# Patient Record
Sex: MALE | Race: ASIAN | NOT HISPANIC OR LATINO | Employment: FULL TIME | URBAN - METROPOLITAN AREA
[De-identification: names, ages, dates, MRNs, and addresses within clinical notes are randomized per-mention and may not be internally consistent; named-entity substitution may affect disease eponyms.]

---

## 2017-03-31 ENCOUNTER — COMMUNICATION - HEALTHEAST (OUTPATIENT)
Dept: SCHEDULING | Facility: CLINIC | Age: 27
End: 2017-03-31

## 2019-07-26 ENCOUNTER — OFFICE VISIT (OUTPATIENT)
Dept: URGENT CARE | Facility: URGENT CARE | Age: 29
End: 2019-07-26
Payer: COMMERCIAL

## 2019-07-26 VITALS
TEMPERATURE: 98 F | OXYGEN SATURATION: 98 % | HEART RATE: 78 BPM | BODY MASS INDEX: 35.02 KG/M2 | RESPIRATION RATE: 19 BRPM | WEIGHT: 236.44 LBS | HEIGHT: 69 IN | SYSTOLIC BLOOD PRESSURE: 128 MMHG | DIASTOLIC BLOOD PRESSURE: 83 MMHG

## 2019-07-26 DIAGNOSIS — E78.00 HYPERCHOLESTEREMIA: ICD-10-CM

## 2019-07-26 DIAGNOSIS — E13.8 OTHER SPECIFIED DIABETES MELLITUS WITH COMPLICATION, WITHOUT LONG-TERM CURRENT USE OF INSULIN: ICD-10-CM

## 2019-07-26 DIAGNOSIS — H10.31 ACUTE BACTERIAL CONJUNCTIVITIS OF RIGHT EYE: Primary | ICD-10-CM

## 2019-07-26 PROCEDURE — 99203 OFFICE O/P NEW LOW 30 MIN: CPT | Performed by: FAMILY MEDICINE

## 2019-07-26 RX ORDER — METFORMIN HCL 500 MG
500 TABLET, EXTENDED RELEASE 24 HR ORAL
COMMUNITY
Start: 2019-01-29 | End: 2020-01-29

## 2019-07-26 RX ORDER — METFORMIN HCL 500 MG
500 TABLET, EXTENDED RELEASE 24 HR ORAL
Qty: 30 TABLET | Refills: 0 | Status: SHIPPED | OUTPATIENT
Start: 2019-07-26 | End: 2019-08-25

## 2019-07-26 RX ORDER — PROPARACAINE HYDROCHLORIDE 5 MG/ML
2 SOLUTION/ DROPS OPHTHALMIC ONCE
Qty: 0.25 ML | Refills: 0
Start: 2019-07-26 | End: 2019-07-26

## 2019-07-26 RX ORDER — ATORVASTATIN CALCIUM 10 MG/1
10 TABLET, FILM COATED ORAL DAILY
Qty: 30 TABLET | Refills: 0 | Status: SHIPPED | OUTPATIENT
Start: 2019-07-26 | End: 2019-08-25

## 2019-07-26 RX ORDER — ATORVASTATIN CALCIUM 10 MG/1
10 TABLET, FILM COATED ORAL
COMMUNITY
Start: 2019-04-29 | End: 2020-04-28

## 2019-07-26 RX ORDER — ALBUTEROL SULFATE 90 UG/1
1-2 AEROSOL, METERED RESPIRATORY (INHALATION)
COMMUNITY
Start: 2019-01-28

## 2019-07-26 RX ORDER — TOBRAMYCIN 3 MG/ML
2 SOLUTION/ DROPS OPHTHALMIC 4 TIMES DAILY
Qty: 3 ML | Refills: 0 | Status: SHIPPED | OUTPATIENT
Start: 2019-07-26 | End: 2019-08-02

## 2019-07-26 ASSESSMENT — MIFFLIN-ST. JEOR: SCORE: 2034.97

## 2019-07-26 NOTE — PROGRESS NOTES
"SUBJECTIVE:Chief Complaint:   Chief Complaint   Patient presents with     Urgent Care     itchy, redness and irritation of right eye since wednesday.      Urgent Care     request medication refills. (the three meds on file).      History of Present Illness: Teagan Akins is a 29 year old male who presents complaining of right eye mattering and redness for 1-2 days.  Onset/timing: gradual.   Associated Signs and Symptoms: none   Treatment measures tried include: none   Contact wearer : No   And needing refill meds    No past medical history on file.  Allergies   Allergen Reactions     Penicillins Rash     Social History     Tobacco Use     Smoking status: Current Every Day Smoker     Types: Cigarettes     Smokeless tobacco: Never Used   Substance Use Topics     Alcohol use: Not on file       ROS:  negative for photophobia, pain, vision change    OBJECTIVE:  /83   Pulse 78   Temp 98  F (36.7  C) (Oral)   Resp 19   Ht 1.764 m (5' 9.45\")   Wt 107.2 kg (236 lb 7 oz)   SpO2 98%   BMI 34.47 kg/m     General: no acute distress  Eye exam: left eye normal lid, conjunctiva, cornea, pupil and fundus, right eye abnormal findings: conjunctivitis with erythema, discharge and matting noted.  JIMMY, EOMI, fundi normal, corneas normal, no foreign bodies, flourescein staining is negative, visual acuity normal both eyes, no periorbital cellulitis      ICD-10-CM    1. Acute bacterial conjunctivitis of right eye H10.31 tobramycin (TOBREX) 0.3 % ophthalmic solution     proparacaine (ALCAINE) 0.5 % ophthalmic solution   2. Other specified diabetes mellitus with complication, without long-term current use of insulin (H) E13.8 metFORMIN (GLUCOPHAGE-XR) 500 MG 24 hr tablet   3. Hypercholesteremia E78.00 atorvastatin (LIPITOR) 10 MG tablet         "

## 2021-03-14 ENCOUNTER — NURSE TRIAGE (OUTPATIENT)
Dept: NURSING | Facility: CLINIC | Age: 31
End: 2021-03-14

## 2021-03-15 NOTE — TELEPHONE ENCOUNTER
Teagan calls and says that he wants to know if he should get the Covid Vaccine or not. Pt. Says that he has asthma and pre diabetes. 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 Says that he is unsure about this. RN then told pt. That getting the vaccine is pt's choice and pt. Can go to the [facility] website to learn more  Information about this. Pt. Voiced understanding. COVID 19 Nurse Triage Plan/Patient Instructions    Please be aware that novel coronavirus (COVID-19) may be circulating in the community. If you develop symptoms such as fever, cough, or SOB or if you have concerns about the presence of another infection including coronavirus (COVID-19), please contact your health care provider or visit [email or web address].     Disposition/Instructions    Home care recommended. Follow home care protocol based instructions.    Thank you for taking steps to prevent the spread of this virus.  o Limit your contact with others.  o Wear a simple mask to cover your cough.  o Wash your hands well and often.    Resources    [facility]: About COVID-19: [email or web address]    CDC: What to Do If You're Sick: www.cdc.gov/coronavirus/2019-ncov/about/steps-when-sick.html    CDC: Ending Home Isolation: www.cdc.gov/coronavirus/2019-ncov/hcp/disposition-in-home-patients.html     CDC: Caring for Someone: www.cdc.gov/coronavirus/2019-ncov/if-you-are-sick/care-for-someone.html     [facility]: Interim Guidance for Hospital Discharge to Home: www.health.[facility].[place]/diseases/coronavirus/hcp/hospdischarge.pdf    [facility] clinical trials (COVID-19 research studies): clinicalaffairs.[facility]-clinical-trials     Below are the COVID-19 hotlines at the [place] Department of Health ([facility]). Interpreters are available.   o For health questions: Call [phone] or 1-589.736.2537 (7 a.m. to 7 p.m.)  o For questions about schools and childcare: Call [phone]  or 1-319.316.1267 (7 a.m. to 7 p.m.)                     Additional Information    Negative: [1] Caller is not with the adult (patient) AND [2] reporting urgent symptoms    Negative: Lab result questions    Negative: Medication questions    Negative: Caller can't be reached by phone    Negative: Caller has already spoken to PCP or another triager    Negative: RN needs further essential information from caller in order to complete triage    Negative: Requesting regular office appointment    Negative: [1] Caller requesting NON-URGENT health information AND [2] PCP's office is the best resource    Health Information question, no triage required and triager able to answer question    Protocols used: INFORMATION ONLY CALL-A-AH

## 2021-03-18 ENCOUNTER — IMMUNIZATION (OUTPATIENT)
Dept: NURSING | Facility: CLINIC | Age: 31
End: 2021-03-18
Payer: COMMERCIAL

## 2021-03-18 PROCEDURE — 91300 PR COVID VAC PFIZER DIL RECON 30 MCG/0.3 ML IM: CPT

## 2021-03-18 PROCEDURE — 0001A PR COVID VAC PFIZER DIL RECON 30 MCG/0.3 ML IM: CPT

## 2021-04-08 ENCOUNTER — IMMUNIZATION (OUTPATIENT)
Dept: NURSING | Facility: CLINIC | Age: 31
End: 2021-04-08
Attending: INTERNAL MEDICINE
Payer: COMMERCIAL

## 2021-04-08 PROCEDURE — 91300 PR COVID VAC PFIZER DIL RECON 30 MCG/0.3 ML IM: CPT

## 2021-04-08 PROCEDURE — 0002A PR COVID VAC PFIZER DIL RECON 30 MCG/0.3 ML IM: CPT

## 2021-04-25 ENCOUNTER — HEALTH MAINTENANCE LETTER (OUTPATIENT)
Age: 31
End: 2021-04-25

## 2021-08-15 ENCOUNTER — HEALTH MAINTENANCE LETTER (OUTPATIENT)
Age: 31
End: 2021-08-15

## 2021-10-10 ENCOUNTER — HEALTH MAINTENANCE LETTER (OUTPATIENT)
Age: 31
End: 2021-10-10

## 2021-12-05 ENCOUNTER — HEALTH MAINTENANCE LETTER (OUTPATIENT)
Age: 31
End: 2021-12-05

## 2022-03-26 ENCOUNTER — HEALTH MAINTENANCE LETTER (OUTPATIENT)
Age: 32
End: 2022-03-26

## 2022-05-21 ENCOUNTER — HEALTH MAINTENANCE LETTER (OUTPATIENT)
Age: 32
End: 2022-05-21

## 2022-07-16 ENCOUNTER — HEALTH MAINTENANCE LETTER (OUTPATIENT)
Age: 32
End: 2022-07-16

## 2022-09-18 ENCOUNTER — HEALTH MAINTENANCE LETTER (OUTPATIENT)
Age: 32
End: 2022-09-18

## 2023-06-04 ENCOUNTER — HEALTH MAINTENANCE LETTER (OUTPATIENT)
Age: 33
End: 2023-06-04

## 2023-11-19 ENCOUNTER — OFFICE VISIT (OUTPATIENT)
Dept: URGENT CARE | Facility: URGENT CARE | Age: 33
End: 2023-11-19
Payer: COMMERCIAL

## 2023-11-19 VITALS
BODY MASS INDEX: 31.91 KG/M2 | OXYGEN SATURATION: 99 % | WEIGHT: 218.9 LBS | TEMPERATURE: 98.5 F | SYSTOLIC BLOOD PRESSURE: 137 MMHG | DIASTOLIC BLOOD PRESSURE: 88 MMHG | HEART RATE: 90 BPM | RESPIRATION RATE: 16 BRPM

## 2023-11-19 DIAGNOSIS — E11.9 TYPE 2 DIABETES MELLITUS WITHOUT COMPLICATION, WITHOUT LONG-TERM CURRENT USE OF INSULIN (H): ICD-10-CM

## 2023-11-19 DIAGNOSIS — R07.0 THROAT PAIN: ICD-10-CM

## 2023-11-19 DIAGNOSIS — J06.9 VIRAL URI: Primary | ICD-10-CM

## 2023-11-19 DIAGNOSIS — R50.9 FEVER IN ADULT: ICD-10-CM

## 2023-11-19 LAB — DEPRECATED S PYO AG THROAT QL EIA: NEGATIVE

## 2023-11-19 PROCEDURE — 87635 SARS-COV-2 COVID-19 AMP PRB: CPT | Performed by: PHYSICIAN ASSISTANT

## 2023-11-19 PROCEDURE — 99203 OFFICE O/P NEW LOW 30 MIN: CPT | Performed by: PHYSICIAN ASSISTANT

## 2023-11-19 PROCEDURE — 87651 STREP A DNA AMP PROBE: CPT | Performed by: PHYSICIAN ASSISTANT

## 2023-11-19 NOTE — PATIENT INSTRUCTIONS
November 19, 2023 Urgent Care Plan      -Home supportive care   -Ok to alternate over the counter doses of Ibuprofen and Tylenol (switch from one to the other every 4 hours) for the next couple of days, as needed for sore throat and fever  -Encourage extra fluids   -Follow-up with your primary care team if not all better in 7-10 days, or sooner if sudden you have any sudden worsening of your current symptoms.     -Watch your MyChart for your second Strep Test result (called Strep PCR). If your second test shows Strep Throat, we will contact you and start you on an antibiotic.     -Watch your MyChart for your Covid PCR test result (typically takes 24-36 hours)     I also provided a number for you to call to check on your lab test results.     If your Covid test is positive, contact your primary care provider or get an appointment with our  [facility] for possible Paxlovid treatment (has to be within 5 days of onset of illness to use this medication)

## 2023-11-19 NOTE — PROGRESS NOTES
SSESSMENT/PLAN:       (J06.9) Viral URI  (primary encounter diagnosis)    MDM: Suspect viral upper respiratory etiology.  No evidence of secondary bacterial infection on exam today.  Rapid strep negative.  Strep PCR pending.  COVID PCR pending.  Please see the below discharge summary/plan (that I reviewed with patient verbally today and provided in printed form for home review).      After Visit Summary/Plan-     November 19, 2023 Urgent Care Plan      -Home supportive care   -Ok to alternate over the counter doses of Ibuprofen and Tylenol (switch from one to the other every 4 hours) for the next couple of days, as needed for sore throat and fever  -Encourage extra fluids   -Follow-up with your primary care team if not all better in 7-10 days, or sooner if sudden you have any sudden worsening of your current symptoms.     -Watch your MyChart for your second Strep Test result (called Strep PCR). If your second test shows Strep Throat, we will contact you and start you on an antibiotic.     -Watch your MyChart for your Covid PCR test result (typically takes 24-36 hours)     I also provided a number for you to call to check on your lab test results.     If your Covid test is positive, contact your primary care provider or get an appointment with our  [facility] for possible Paxlovid treatment (has to be within 5 days of onset of illness to use this medication)       (E11.9) Type 2 diabetes mellitus without complication, without long-term current use of insulin (H)    (R07.0) Throat pain  Plan: Streptococcus A Rapid Screen w/Reflex to PCR -         Clinic Collect, Group A Streptococcus PCR         Throat Swab, Symptomatic COVID-19 Virus         (Coronavirus) by PCR Nose     (R50.9) Fever in adult  Plan: Symptomatic COVID-19 Virus (Coronavirus) by PCR        Nose    This progress note has been dictated, with use of voice recognition software. Any grammatical, typographical, or context errors are  unintentional and inherent to use of voice recognition software.  --------------------          Chief Complaint   Patient presents with    Throat Pain     34 yo M presents with the following complaint throat pain onset Friday  tx- ibuprofen  last dose on Saturday         SUBJECTIVE:     Teagan Akins 33 year old male, with a past medical history that includes diabetes, presenting to urgent care today for evaluation of cute onset fever (patient states highest fever so far was 100.9  F), generalized waxing and waning headache and throat pain Thursday evening (now 3 days ago).  Patient confirms she is still able to take in good fluids and soft food despite sore throat.      Patient states he works at [employer] and tells me they have fairly strict policy about returning to work with any fever or upper respiratory infection symptoms.  He is requesting a note to be excused from work.      ROS: No associated cough, shortness of breath, wheezing, abdominal pain, nausea, vomiting, diarrhea, body aches, headaches, rashes, joint swelling or other acute illness symptoms.        No past medical history on file.    Patient Active Problem List   Diagnosis    Asthma         Current Outpatient Medications   Medication    albuterol (VENTOLIN HFA) 108 (90 Base) MCG/ACT inhaler    atorvastatin (LIPITOR) 10 MG tablet    metFORMIN (GLUCOPHAGE-XR) 500 MG 24 hr tablet     No current facility-administered medications for this visit.       Allergies   Allergen Reactions    Penicillins Rash           OBJECTIVE:  /88   Pulse 90   Temp 98.5  F (36.9  C)   Resp 16   Wt 99.3 kg (218 lb 14.4 oz)   SpO2 99%   BMI 31.91 kg/m            General appearance: alert and no apparent distress  Skin color is uniform in color  and without rash.  HEENT:   Conjunctiva not injected.  Sclera clear.  Left TM is normal: no effusions, no erythema, and normal landmarks.  Right TM is normal: no effusions, no erythema, and normal  landmarks.  Nasal mucosa is normal.  Oropharyngeal exam is positive for mild to moderate, generalized, posterior pharyngeal erythema.  No asymmetry. Uvula is midline. No trismus. Voice is clear. No lesions, adenopathy, plaque or exudate.  Neck is supple, FROM. No neck stiffness. No adenopathy  CARDIAC:NORMAL - regular rate and rhythm without murmur.  RESP: No increased work of breathing. . No stridor. Lung fields are clear to ausculation. No rales, rhonchi, or wheezing.  NEURO: Alert and oriented.  Normal speech and mentation.  CN II/XII grossly intact.  Gait within normal limits.          LAB:      Results for orders placed or performed in visit on 11/19/23   Streptococcus A Rapid Screen w/Reflex to PCR - Clinic Collect     Status: Normal    Specimen: Throat; Swab   Result Value Ref Range    Group A Strep antigen Negative Negative          Strep PCR test result is pending   COVID PCR test result is pending    A

## 2023-11-19 NOTE — LETTER
November 19, 2023      Teagan Akins  [address] [facility] [place] [place] [facility] [address]        To Whom It May Concern:    Teagan Akins was seen here today for evaluation of an acute medical illness. Please excuse him from missed work until he is 24 hours without fever and until he gets the results of his pending lab tests (which will be Monday or Tuesday).       Sincerely,        Leonard Mary PA-C

## 2023-11-20 ENCOUNTER — NURSE TRIAGE (OUTPATIENT)
Dept: NURSING | Facility: CLINIC | Age: 33
End: 2023-11-20
Payer: COMMERCIAL

## 2023-11-20 LAB
GROUP A STREP BY PCR: NOT DETECTED
SARS-COV-2 RNA RESP QL NAA+PROBE: NEGATIVE

## 2023-11-20 NOTE — TELEPHONE ENCOUNTER
Pt waiting on test result for COVID taken 11/19/23.  Test still in process shared with pt.  Jossie Abbott RN  [facility] Nurse Advisor

## 2023-12-17 ENCOUNTER — HEALTH MAINTENANCE LETTER (OUTPATIENT)
Age: 33
End: 2023-12-17

## 2024-01-09 ENCOUNTER — NURSE TRIAGE (OUTPATIENT)
Dept: NURSING | Facility: CLINIC | Age: 34
End: 2024-01-09
Payer: COMMERCIAL

## 2024-01-09 NOTE — TELEPHONE ENCOUNTER
Mararith calling asking if he needs a COVID PCR test for proof.  He had a positive COVID home test 1/7/23. He presently denies symptom and said he only had a headache and body aches a week ago. Advised he does not need a PCR test.  Galilea Lewis RN on 1/9/2024 at 4:06 PM      Reason for Disposition   Health information question, no triage required and triager able to answer question    Protocols used: Information Only Call - No Triage-A-OH

## 2024-05-05 ENCOUNTER — HEALTH MAINTENANCE LETTER (OUTPATIENT)
Age: 34
End: 2024-05-05

## 2024-09-22 ENCOUNTER — HEALTH MAINTENANCE LETTER (OUTPATIENT)
Age: 34
End: 2024-09-22

## 2025-01-02 ENCOUNTER — HOSPITAL ENCOUNTER (EMERGENCY)
Facility: CLINIC | Age: 35
Discharge: HOME | End: 2025-01-02
Attending: STUDENT IN AN ORGANIZED HEALTH CARE EDUCATION/TRAINING PROGRAM
Payer: COMMERCIAL

## 2025-01-02 VITALS
BODY MASS INDEX: 31.07 KG/M2 | DIASTOLIC BLOOD PRESSURE: 91 MMHG | HEART RATE: 86 BPM | RESPIRATION RATE: 20 BRPM | HEIGHT: 70 IN | SYSTOLIC BLOOD PRESSURE: 138 MMHG | OXYGEN SATURATION: 98 % | WEIGHT: 217 LBS | TEMPERATURE: 97 F

## 2025-01-02 DIAGNOSIS — E11.65 TYPE 2 DIABETES MELLITUS WITH HYPERGLYCEMIA, WITHOUT LONG-TERM CURRENT USE OF INSULIN (H): ICD-10-CM

## 2025-01-02 LAB
ALBUMIN UR-MCNC: 30 MG/DL
ANION GAP SERPL CALCULATED.3IONS-SCNC: 13 MMOL/L (ref 7–15)
APPEARANCE UR: CLEAR
BASE EXCESS BLDV CALC-SCNC: -1.1 MMOL/L (ref -3–3)
BASOPHILS # BLD AUTO: 0 10E3/UL (ref 0–0.2)
BASOPHILS NFR BLD AUTO: 0 %
BILIRUB UR QL STRIP: NEGATIVE
BUN SERPL-MCNC: 13.8 MG/DL (ref 6–20)
CALCIUM SERPL-MCNC: 9.3 MG/DL (ref 8.8–10.4)
CHLORIDE SERPL-SCNC: 101 MMOL/L (ref 98–107)
COLOR UR AUTO: COLORLESS
CREAT SERPL-MCNC: 0.65 MG/DL (ref 0.67–1.17)
EGFRCR SERPLBLD CKD-EPI 2021: >90 ML/MIN/1.73M2
EOSINOPHIL # BLD AUTO: 0.3 10E3/UL (ref 0–0.7)
EOSINOPHIL NFR BLD AUTO: 4 %
ERYTHROCYTE [DISTWIDTH] IN BLOOD BY AUTOMATED COUNT: 12.7 % (ref 10–15)
GLUCOSE BLDC GLUCOMTR-MCNC: 270 MG/DL (ref 70–99)
GLUCOSE SERPL-MCNC: 264 MG/DL (ref 70–99)
GLUCOSE UR STRIP-MCNC: 1000 MG/DL
HCO3 BLDV-SCNC: 24 MMOL/L (ref 21–28)
HCO3 SERPL-SCNC: 22 MMOL/L (ref 22–29)
HCT VFR BLD AUTO: 46 % (ref 40–53)
HGB BLD-MCNC: 15.7 G/DL (ref 13.3–17.7)
HGB UR QL STRIP: NEGATIVE
IMM GRANULOCYTES # BLD: 0 10E3/UL
IMM GRANULOCYTES NFR BLD: 1 %
KETONES UR STRIP-MCNC: 5 MG/DL
LEUKOCYTE ESTERASE UR QL STRIP: NEGATIVE
LYMPHOCYTES # BLD AUTO: 3 10E3/UL (ref 0.8–5.3)
LYMPHOCYTES NFR BLD AUTO: 36 %
MCH RBC QN AUTO: 26.7 PG (ref 26.5–33)
MCHC RBC AUTO-ENTMCNC: 34.1 G/DL (ref 31.5–36.5)
MCV RBC AUTO: 78 FL (ref 78–100)
MONOCYTES # BLD AUTO: 0.7 10E3/UL (ref 0–1.3)
MONOCYTES NFR BLD AUTO: 9 %
NEUTROPHILS # BLD AUTO: 4.2 10E3/UL (ref 1.6–8.3)
NEUTROPHILS NFR BLD AUTO: 50 %
NITRATE UR QL: NEGATIVE
NRBC # BLD AUTO: 0 10E3/UL
NRBC BLD AUTO-RTO: 0 /100
O2/TOTAL GAS SETTING VFR VENT: 21 %
OXYHGB MFR BLDV: 78 % (ref 70–75)
PCO2 BLDV: 43 MM HG (ref 40–50)
PH BLDV: 7.37 [PH] (ref 7.32–7.43)
PH UR STRIP: 6.5 [PH] (ref 5–7)
PLATELET # BLD AUTO: 231 10E3/UL (ref 150–450)
PO2 BLDV: 43 MM HG (ref 25–47)
POTASSIUM SERPL-SCNC: 4.1 MMOL/L (ref 3.4–5.3)
RBC # BLD AUTO: 5.88 10E6/UL (ref 4.4–5.9)
RBC URINE: 1 /HPF
SAO2 % BLDV: 80 % (ref 70–75)
SODIUM SERPL-SCNC: 136 MMOL/L (ref 135–145)
SP GR UR STRIP: 1.02 (ref 1–1.03)
UROBILINOGEN UR STRIP-MCNC: <2 MG/DL
WBC # BLD AUTO: 8.2 10E3/UL (ref 4–11)
WBC URINE: 0 /HPF

## 2025-01-02 PROCEDURE — 81001 URINALYSIS AUTO W/SCOPE: CPT | Performed by: STUDENT IN AN ORGANIZED HEALTH CARE EDUCATION/TRAINING PROGRAM

## 2025-01-02 PROCEDURE — 82962 GLUCOSE BLOOD TEST: CPT

## 2025-01-02 PROCEDURE — 82805 BLOOD GASES W/O2 SATURATION: CPT | Performed by: STUDENT IN AN ORGANIZED HEALTH CARE EDUCATION/TRAINING PROGRAM

## 2025-01-02 PROCEDURE — 36415 COLL VENOUS BLD VENIPUNCTURE: CPT | Performed by: STUDENT IN AN ORGANIZED HEALTH CARE EDUCATION/TRAINING PROGRAM

## 2025-01-02 PROCEDURE — 80048 BASIC METABOLIC PNL TOTAL CA: CPT | Performed by: STUDENT IN AN ORGANIZED HEALTH CARE EDUCATION/TRAINING PROGRAM

## 2025-01-02 PROCEDURE — 82435 ASSAY OF BLOOD CHLORIDE: CPT | Performed by: STUDENT IN AN ORGANIZED HEALTH CARE EDUCATION/TRAINING PROGRAM

## 2025-01-02 PROCEDURE — 99283 EMERGENCY DEPT VISIT LOW MDM: CPT

## 2025-01-02 PROCEDURE — 85025 COMPLETE CBC W/AUTO DIFF WBC: CPT | Performed by: STUDENT IN AN ORGANIZED HEALTH CARE EDUCATION/TRAINING PROGRAM

## 2025-01-02 PROCEDURE — 85014 HEMATOCRIT: CPT | Performed by: STUDENT IN AN ORGANIZED HEALTH CARE EDUCATION/TRAINING PROGRAM

## 2025-01-02 PROCEDURE — 82374 ASSAY BLOOD CARBON DIOXIDE: CPT | Performed by: STUDENT IN AN ORGANIZED HEALTH CARE EDUCATION/TRAINING PROGRAM

## 2025-01-02 ASSESSMENT — ACTIVITIES OF DAILY LIVING (ADL)
ADLS_ACUITY_SCORE: 41
ADLS_ACUITY_SCORE: 41

## 2025-01-02 ASSESSMENT — COLUMBIA-SUICIDE SEVERITY RATING SCALE - C-SSRS
1. IN THE PAST MONTH, HAVE YOU WISHED YOU WERE DEAD OR WISHED YOU COULD GO TO SLEEP AND NOT WAKE UP?: NO
6. HAVE YOU EVER DONE ANYTHING, STARTED TO DO ANYTHING, OR PREPARED TO DO ANYTHING TO END YOUR LIFE?: NO
2. HAVE YOU ACTUALLY HAD ANY THOUGHTS OF KILLING YOURSELF IN THE PAST MONTH?: NO

## 2025-01-02 NOTE — ED PROVIDER NOTES
EMERGENCY DEPARTMENT ENCOUNTER      NAME: Teagan Akins  AGE: 34 year old male  YOB: 1990  MRN: [id number]  EVALUATION DATE & TIME: 2025  2:37 AM    PCP: Ino Hernandez    ED PROVIDER: Frederick Johnston MD      Chief Complaint   Patient presents with    Hyperglycemia         FINAL IMPRESSION:  1. Type 2 diabetes mellitus with hyperglycemia, without long-term current use of insulin (H)          ED COURSE & MEDICAL DECISION MAKING:    Pertinent Labs & Imaging studies reviewed. (See chart for details)  34 year old male presents to the Emergency Department for evaluation of high blood sugar    ED Course as of 25 0357   Thu 2025   0259 Patient is a 34-year-old male with history of diabetes presenting to emergency department for evaluation of high blood sugars at home.  Patient was previously was on insulin in addition to metformin but about a year ago seem to be adequately controlled insulin was discontinued.  However over the past few days to week or so has been noting higher than usual blood sugars in the 200s to 300s.  Is also noted some increased frequency of urination although denies any dysuria.  Denies any fevers.  No nausea or vomiting.  No chest pain of breath.  His insulin is currently  and he is only taking metformin 1000 mg twice daily.  Given his elevated blood sugar at home greater than 300 contacted nursing line who recommended he be evaluated emergency department.    Exam here patient is well-appearing in no acute distress.  Hemodynamically stable and afebrile.  Moist mucous membranes.  Is awake alert and oriented answers questions appropriately.  Heart is regular rate and rhythm.  Warm well-perfused extremities.  Abdomen soft nontender.    Initial  point-of-care glucose here 270.  He has had some increased frequency urination as well as polydipsia here and will obtain labs to evaluate for any signs of significant electrolyte derangement or DKA   1668 Labs reviewed  interpreted myself.  CBC shows normal white count and hemoglobin.  Chemistry shows hyperglycemia but no anion gap or significant electrolyte derangements.  VBG shows a normal pH and patient is not in DKA.  Urinalysis not suggestive of infection.   0348 At this point in time recommend patient  continue his current metformin and he is scheduled to start Ozempic and his prescription will start tomorrow.  Otherwise do not think he requires emergent control of his glucose I do recommend close outpatient follow-up for ongoing management.       Labs discussed with patient.  He expresses understanding feels comfortable discharge home at this point in time.  Discharged in stable condition.    2:40 AM I met and evaluated the patient.   3:49 AM I rechecked and updated patient on results and care plan.      Medical Decision Making  Obtained supplemental history:Supplemental history obtained?: Documented in chart  Reviewed external records: External records reviewed?: Documented in chart  Care impacted by chronic illness:Documented in Chart and Diabetes  Care significantly affected by social determinants of health:Access to Medical Care  Did you consider but not order tests?: Work up considered but not performed and documented in chart, if applicable  Did you interpret images independently?: Independent interpretation of ECG and images noted in documentation, when applicable.  Consultation discussion with other provider:Did you involve another provider (consultant, MH, pharmacy, etc.)?: No  Discharge. No recommendations on prescription strength medication(s). I considered admission, but discharged patient after significant clinical improvement.  Not Applicable          At the conclusion of the encounter I discussed the results of all of the tests and the disposition. The questions were answered. The patient or family acknowledged understanding and was agreeable with the care plan.     0 minutes of critical care time      MEDICATIONS GIVEN IN THE EMERGENCY:  Medications - No data to display    NEW PRESCRIPTIONS STARTED AT TODAY'S ER VISIT  New Prescriptions    No medications on file          =================================================================    HPI    Patient information was obtained from: Patient    Use of : N/A         Teagan Akins is a 34 year old male with a pertinent history of DM2 who presents to this ED by walk-in for evaluation of hyperglycemia.     Patient reports he noticed elevated blood sugars over the last few days. Patient mentions he has been having headaches in the mornings, feels thirsty, and has been urinating more frequently. He also notes some tingling in legs and hands. He states that he was previously on insulin but has been off this for almost a year and placed on 2000 mg Metformin due to good diet habits. Patient was doing ok on Metformin and was planning to get started on Ozempic, but did not start this yet due to his insurance coverage but will start tomorrow. Patient was not checking his sugars as they were controlled but he recently checked last week as he was eating poorly and noticed his sugars increasing. Last week, glucose was 260. Today, blood sugar was 378. He called nurse triage line and was recommended to be seen in the ED to rule out DKA. Otherwise, he denies any fevers, dysuria, abdominal pain, and vomiting. No other complaints at this time.     REVIEW OF SYSTEMS   Refer to the [facility]    PAST MEDICAL HISTORY:  History reviewed. No pertinent past medical history.    PAST SURGICAL HISTORY:  Past Surgical History:   Procedure Laterality Date    WISDOM TOOTH EXTRACTION             CURRENT MEDICATIONS:    albuterol (VENTOLIN HFA) 108 (90 Base) MCG/ACT inhaler  atorvastatin (LIPITOR) 10 MG tablet  metFORMIN (GLUCOPHAGE-XR) 500 MG 24 hr tablet        ALLERGIES:  Allergies   Allergen Reactions    Penicillins Rash       FAMILY HISTORY:  Family History   Problem Relation Age of  "Onset    No Known Problems Mother     Diabetes Father     Diabetes Brother     Diabetes Paternal Grandmother        SOCIAL HISTORY:   Social History     Socioeconomic History    Marital status: Single   Tobacco Use    Smoking status: Every Day     Types: Cigarettes    Smokeless tobacco: Never     Social Drivers of Health     Financial Resource Strain: Medium Risk (8/9/2023)    [facility]    Financial Resource Strain     Difficulty of Paying Living Expenses: 2     Difficulty of Paying Living Expenses: 1   Food Insecurity: Food Insecurity Present (8/9/2023)    [facility]    Food Insecurity     Worried About Running Out of Food in the Last Year: 2   Transportation Needs: No Transportation Needs (8/9/2023)    Received from [facility]    Transportation Needs     Lack of Transportation (Medical): 1    Received from [facility]    Social Connections   Housing Stability: Low Risk  (8/9/2023)    Received from [facility] [facility]    Housing Stability     Unable to Pay for Housing in the Last Year: 1       VITALS:  BP (!) 160/109   Pulse 89   Temp 97  F (36.1  C) (Temporal)   Resp 20   Ht 1.778 m (5' 10\")   Wt 98.4 kg (217 lb)   SpO2 97%   BMI 31.14 kg/m      PHYSICAL EXAM    Constitutional: Well developed, Well nourished, NAD,   HENT: Normocephalic, Atraumatic,  mucous membranes moist,   Neck- trachea midline, No stridor.    Eyes:EOMI, Conjunctiva normal, No discharge.   Respiratory: Normal breath sounds, No respiratory distress, No wheezing.    Cardiovascular: Normal heart rate, Regular rhythm,  No murmurs,   Abdominal: Soft, No tenderness, No rebound " or guarding.     Musculoskeletal: no deformity or malalignment   Integument: Warm, Dry, No erythema,    Neurologic: Alert & oriented x 3   Psychiatric: Affect normal, Cooperative.      LAB:  All pertinent labs reviewed and interpreted.  Results for orders placed or performed during the hospital encounter of 01/02/25   Glucose by meter   Result Value Ref Range    GLUCOSE BY METER POCT 270 (H) 70 - 99 mg/dL   Basic metabolic panel   Result Value Ref Range    Sodium 136 135 - 145 mmol/L    Potassium 4.1 3.4 - 5.3 mmol/L    Chloride 101 98 - 107 mmol/L    Carbon Dioxide (CO2) 22 22 - 29 mmol/L    Anion Gap 13 7 - 15 mmol/L    Urea Nitrogen 13.8 6.0 - 20.0 mg/dL    Creatinine 0.65 (L) 0.67 - 1.17 mg/dL    GFR Estimate >90 >60 mL/min/1.73m2    Calcium 9.3 8.8 - 10.4 mg/dL    Glucose 264 (H) 70 - 99 mg/dL   Blood gas venous   Result Value Ref Range    pH Venous 7.37 7.32 - 7.43    pCO2 Venous 43 40 - 50 mm Hg    pO2 Venous 43 25 - 47 mm Hg    Bicarbonate Venous 24 21 - 28 mmol/L    Base Excess/Deficit Venous -1.1 -3.0 - 3.0 mmol/L    FIO2 21     Oxyhemoglobin Venous 78 (H) 70 - 75 %    O2 Sat, Venous 80.0 (H) 70.0 - 75.0 %   UA with Microscopic reflex to Culture    Specimen: Urine, Clean Catch   Result Value Ref Range    Color Urine Colorless Colorless, Straw, Light Yellow, Yellow    Appearance Urine Clear Clear    Glucose Urine 1000 (A) Negative mg/dL    Bilirubin Urine Negative Negative    Ketones Urine 5 (A) Negative mg/dL    Specific Gravity Urine 1.020 1.005 - 1.030    Blood Urine Negative Negative    pH Urine 6.5 5.0 - 7.0    Protein Albumin Urine 30 (A) Negative mg/dL    Urobilinogen Urine <2.0 <2.0 mg/dL    Nitrite Urine Negative Negative    Leukocyte Esterase Urine Negative Negative    RBC Urine 1 <=2 /HPF    WBC Urine 0 <=5 /HPF   CBC with platelets and differential   Result Value Ref Range    WBC Count 8.2 4.0 - 11.0 10e3/uL    RBC Count 5.88 4.40 - 5.90 10e6/uL    Hemoglobin 15.7 13.3 - 17.7 g/dL    Hematocrit  46.0 40.0 - 53.0 %    MCV 78 78 - 100 fL    MCH 26.7 26.5 - 33.0 pg    MCHC 34.1 31.5 - 36.5 g/dL    RDW 12.7 10.0 - 15.0 %    Platelet Count 231 150 - 450 10e3/uL    % Neutrophils 50 %    % Lymphocytes 36 %    % Monocytes 9 %    % Eosinophils 4 %    % Basophils 0 %    % Immature Granulocytes 1 %    NRBCs per 100 WBC 0 <1 /100    Absolute Neutrophils 4.2 1.6 - 8.3 10e3/uL    Absolute Lymphocytes 3.0 0.8 - 5.3 10e3/uL    Absolute Monocytes 0.7 0.0 - 1.3 10e3/uL    Absolute Eosinophils 0.3 0.0 - 0.7 10e3/uL    Absolute Basophils 0.0 0.0 - 0.2 10e3/uL    Absolute Immature Granulocytes 0.0 <=0.4 10e3/uL    Absolute NRBCs 0.0 10e3/uL       RADIOLOGY:  Reviewed all pertinent imaging. Please see official radiology report.  No orders to display       EKG:        PROCEDURES:         [facility] System Documentation:   CMS Diagnoses:              I, Shalini Portillo, am serving as a scribe to document services personally performed by Frederick Johnston MD based on my observation and the provider's statements to me. I, Frederick Johnston MD, attest that Shalini Portillo is acting in a scribe capacity, has observed my performance of the services and has documented them in accordance with my direction.    Frederick Johnston MD  [facility] EMERGENCY ROOM  [address] [address] [phone]      Frederick Johnston MD  01/02/25 1070

## 2025-01-02 NOTE — Clinical Note
Teagan Akins was seen and treated in our emergency department on 1/2/2025.  He may return to work on 01/03/2025.       If you have any questions or concerns, please don't hesitate to call.      Frederick Johnston MD

## 2025-01-02 NOTE — ED NOTES
Pt feels comfortable going home. Has no question. Education given on managing diabetes. Pt aware to see primary care. Pt AOX4. Denying cp/sob/visual issues. Reporting only symptom he has had is wanting water more often.

## 2025-01-02 NOTE — DISCHARGE INSTRUCTIONS
Your blood sugar was high today at around 270 however there are no signs of dangerous electrolyte problems or diabetic ketoacidosis on your lab work.  I would recommend continuing your metformin and discuss with your primary care doctor if they would continue to recommend Ozempic versus reinitiation of insulin.  In the short-term we want to avoid overly aggressive blood sugar control as low blood sugars can be very dangerous but over time will be important for your long-term health to have better control in preventing significant elevated blood sugars.

## 2025-01-02 NOTE — ED TRIAGE NOTES
Patient presents to the ED complaining of blood sugars in the 300's at home.  He states he hasn't been taking insulin for some time and has been solely on Metformin due to good diet habits.  He states he has been eating poorly lately and noticed his sugars increasing.  States he is more thirsty than normal and urinating more often.     Triage Assessment (Adult)       Row Name 01/02/25 0235          Triage Assessment    Airway WDL WDL        Respiratory WDL    Respiratory WDL WDL        Skin Circulation/Temperature WDL    Skin Circulation/Temperature WDL WDL        Cardiac WDL    Cardiac WDL WDL        Peripheral/Neurovascular WDL    Peripheral Neurovascular WDL WDL        Cognitive/Neuro/Behavioral WDL    Cognitive/Neuro/Behavioral WDL WDL

## 2025-01-12 ENCOUNTER — HEALTH MAINTENANCE LETTER (OUTPATIENT)
Age: 35
End: 2025-01-12

## 2025-04-26 ENCOUNTER — HEALTH MAINTENANCE LETTER (OUTPATIENT)
Age: 35
End: 2025-04-26

## 2025-08-09 ENCOUNTER — HEALTH MAINTENANCE LETTER (OUTPATIENT)
Age: 35
End: 2025-08-09